# Patient Record
Sex: FEMALE | Race: WHITE | NOT HISPANIC OR LATINO
[De-identification: names, ages, dates, MRNs, and addresses within clinical notes are randomized per-mention and may not be internally consistent; named-entity substitution may affect disease eponyms.]

---

## 2022-11-02 PROBLEM — Z00.00 ENCOUNTER FOR PREVENTIVE HEALTH EXAMINATION: Status: ACTIVE | Noted: 2022-11-02

## 2022-11-09 ENCOUNTER — APPOINTMENT (OUTPATIENT)
Dept: ORTHOPEDIC SURGERY | Facility: CLINIC | Age: 32
End: 2022-11-09

## 2022-11-09 VITALS — WEIGHT: 130 LBS | HEIGHT: 64 IN | BODY MASS INDEX: 22.2 KG/M2

## 2022-11-09 DIAGNOSIS — M79.671 PAIN IN RIGHT FOOT: ICD-10-CM

## 2022-11-09 DIAGNOSIS — M76.71 PERONEAL TENDINITIS, RIGHT LEG: ICD-10-CM

## 2022-11-09 DIAGNOSIS — Z78.9 OTHER SPECIFIED HEALTH STATUS: ICD-10-CM

## 2022-11-09 PROCEDURE — 99204 OFFICE O/P NEW MOD 45 MIN: CPT

## 2022-11-09 PROCEDURE — 73610 X-RAY EXAM OF ANKLE: CPT | Mod: RT

## 2022-11-09 PROCEDURE — 73630 X-RAY EXAM OF FOOT: CPT | Mod: RT

## 2022-11-09 RX ORDER — MELOXICAM 15 MG/1
15 TABLET ORAL DAILY
Qty: 30 | Refills: 0 | Status: ACTIVE | COMMUNITY
Start: 2022-11-09 | End: 1900-01-01

## 2022-11-09 NOTE — IMAGING
[de-identified] : General: Well-nourished, well developed, in no apparent distress\par Psych: Clear speech, pleasant mood and affect\par Neurological: Alert and oriented to person, place, and time\par Gait: Normal\par Respiratory: Normal respiratory effort\par Skin: No rashes, no lesions, no open skin, no ecchymosis, no erythema\par \par \par Inspection: No swelling, ecchymosis or redness noted.\par \par Alignment: Hindfoot alignment in anatomic valgus, neutral midfoot alignment.\par  \par Palpation: No anterior medial, central or lateral ankle joint line tenderness. No posterior medial or lateral ankle pain. No pain over proximal, midshaft or distal tibia or fibula. Leg Compartments are soft and nontender. Calf is soft and non-tender with a down-going Emery test. No pain over the lateral and medial malleolus. No pain over ATFL and CFL. Non-tender over the deltoid ligament or proximal and distal syndesmosis. Negative squeeze test of the syndesmosis. Non-tender over the fibula head or neck. Non-tender over the sinus tarsi.\par No pain over the course of the achilles, peroneal, posterior tibial, anterior tibial or the extensor tendons. \par On examination of the foot: Foot compartments are soft and nontender. No pain over the heel or plantar fascia. No tenderness over the transverse tarsal joints, TMT or the Lisfranc joints on palpation and stress examination. No tenderness over the navicular, cuboid, cuneiforms, or metatarsals shafts. No pain beneath the metatarsal heads. Full range of motion through the MTP joints. The toes are reduced and well aligned. No pain on examination of the toes, and no webspace tenderness noted\par He is tender over the plantar lateral aspect of the midfoot beneath the fifth metatarsal as well as over the distal peroneal brevis tendon.\par \par ROM: 15-20 degrees of dorsiflexion, 40 degrees of plantar flexion, 20 degrees of inversion and 20 degrees of eversion without pain. Able to flex and extend all toes without pain \par \par Muscle Strength: 5/5 strength with resisted dorsiflexion, plantar flexion, inversion and eversion without any pain.\par \par Stability: No instability or laxity noted with varus/valgus stress. Negative anterior and posterior drawer test. No pain with dorsiflexion external rotation stress test.\par \par Neurologic Exam : Sensation is grossly intact bilateral upper and lower extremities to light touch throughout. Sensation intact to the sural, posterior tibial, superficial peroneal nerve. 2+ patellar tendon and Achilles tendon reflexes are noted. There is a downgoing Babinski test. No clonus is noted bilaterally.\par \par Vascular: Arterial Pulses right and Left: posterior tibialis normal and dorsalis pedis normal. Right and Left: no edema, no varicosities and brisk capillary refill test normal.\par \par Radiographs: X-rays done today in the office 3 views of the ankle and foot without any limitations which reveal: No acute fractures or dislocations seen. The ankle mortise and syndesmosis are reduced and well aligned. There is no widening of the syndesmosis. No evidence of an osteochondral defect. No fractures of the lateral process of the talus or of the anterior process of the calcaneus noted. The midfoot and forefoot joints are reduced and well aligned.

## 2022-11-09 NOTE — HISTORY OF PRESENT ILLNESS
[Sudden] : sudden [5] : 5 [3] : 3 [Dull/Aching] : dull/aching [Constant] : constant [de-identified] : Ms. ROSS is a 32 year female who presents today for evaluation of their right foot pain.  She states that over the past 2 months she has been developing pain on the outside aspect of her right foot.  She does not recall a distinct injury to the right ankle or right foot or a misstep.  She does stay physically active and enjoys spinning as well as powerwalking.  She has been walking and poorly supportive sneakers and shoes as well as a 2-year-old pair of sneakers.  The pain is a soreness she does not notice any redness warmth or bruising of the ankle or foot. [] : no [FreeTextEntry1] : right foot  [FreeTextEntry5] :  BRAD ROSS is a 32 year female who is here today for right foot pain, states she has been having pain for 2 months. denies any injury.

## 2023-02-28 ENCOUNTER — TRANSCRIPTION ENCOUNTER (OUTPATIENT)
Age: 33
End: 2023-02-28

## 2023-07-24 ENCOUNTER — APPOINTMENT (OUTPATIENT)
Dept: OTOLARYNGOLOGY | Facility: CLINIC | Age: 33
End: 2023-07-24

## 2024-09-24 ENCOUNTER — ASOB RESULT (OUTPATIENT)
Age: 34
End: 2024-09-24

## 2024-09-24 ENCOUNTER — APPOINTMENT (OUTPATIENT)
Dept: ANTEPARTUM | Facility: CLINIC | Age: 34
End: 2024-09-24
Payer: COMMERCIAL

## 2024-09-24 PROCEDURE — 99202 OFFICE O/P NEW SF 15 MIN: CPT | Mod: 25

## 2024-09-24 PROCEDURE — 76805 OB US >/= 14 WKS SNGL FETUS: CPT

## 2024-10-15 ENCOUNTER — ASOB RESULT (OUTPATIENT)
Age: 34
End: 2024-10-15

## 2024-10-15 ENCOUNTER — APPOINTMENT (OUTPATIENT)
Dept: ANTEPARTUM | Facility: CLINIC | Age: 34
End: 2024-10-15
Payer: COMMERCIAL

## 2024-10-15 PROCEDURE — 76811 OB US DETAILED SNGL FETUS: CPT

## 2025-01-05 ENCOUNTER — OUTPATIENT (OUTPATIENT)
Dept: OUTPATIENT SERVICES | Facility: HOSPITAL | Age: 35
LOS: 1 days | End: 2025-01-05
Payer: COMMERCIAL

## 2025-01-05 VITALS — DIASTOLIC BLOOD PRESSURE: 67 MMHG | SYSTOLIC BLOOD PRESSURE: 107 MMHG | HEART RATE: 84 BPM

## 2025-01-05 VITALS — SYSTOLIC BLOOD PRESSURE: 117 MMHG | HEART RATE: 99 BPM | DIASTOLIC BLOOD PRESSURE: 64 MMHG

## 2025-01-05 DIAGNOSIS — O26.899 OTHER SPECIFIED PREGNANCY RELATED CONDITIONS, UNSPECIFIED TRIMESTER: ICD-10-CM

## 2025-01-05 LAB
FLUAV AG NPH QL: SIGNIFICANT CHANGE UP
FLUBV AG NPH QL: SIGNIFICANT CHANGE UP
RAPID RVP RESULT: SIGNIFICANT CHANGE UP
RSV RNA NPH QL NAA+NON-PROBE: SIGNIFICANT CHANGE UP
SARS-COV-2 RNA SPEC QL NAA+PROBE: SIGNIFICANT CHANGE UP
SARS-COV-2 RNA SPEC QL NAA+PROBE: SIGNIFICANT CHANGE UP

## 2025-01-05 PROCEDURE — 87637 SARSCOV2&INF A&B&RSV AMP PRB: CPT

## 2025-01-05 PROCEDURE — G0463: CPT

## 2025-01-05 PROCEDURE — 0225U NFCT DS DNA&RNA 21 SARSCOV2: CPT

## 2025-01-05 PROCEDURE — 59025 FETAL NON-STRESS TEST: CPT

## 2025-01-05 RX ORDER — FAMOTIDINE 20 MG/1
20 TABLET, FILM COATED ORAL ONCE
Refills: 0 | Status: COMPLETED | OUTPATIENT
Start: 2025-01-05 | End: 2025-01-05

## 2025-01-05 RX ADMIN — FAMOTIDINE 20 MILLIGRAM(S): 20 TABLET, FILM COATED ORAL at 14:59

## 2025-01-05 NOTE — OB PROVIDER TRIAGE NOTE - NSOBPROVIDERNOTE_OBGYN_ALL_OB_FT
BRAD ROSS is a 34y  at 32w 1d GA who presents to L&D for decreased FM.     BPP   Dispo: Continue to observe.     Discussed with  BRAD ROSS is a 34y  at 32w 1d GA who presents to L&D for decreased FM.     BPP  with active NST. RVP negative. Pt to be discharged home. Will continue Tylenol for symptomatic relief. Return precautions discussed. Patient to follow up with Dr. Zendejas outpatient.     Discussed with Dr. Aashish Cutler, PGY-1

## 2025-01-05 NOTE — OB PROVIDER TRIAGE NOTE - NS ATTEND AMEND GEN_ALL_CORE FT
P0 who called the service reporting low grad temp and URI symptoms  also then that there was decreased fetal movements  spoke with the in patient team - reassuring cat 1 tracing - bpp 8/8   VSS and afebrile  RVP negative  will d/c home with precautions and follow up with changes or concerns particularly if fever occurs  Christin Zendejas MD

## 2025-01-05 NOTE — OB PROVIDER TRIAGE NOTE - NSHPPHYSICALEXAM_GEN_ALL_CORE
T(C): 37.4 (01-05-25 @ 14:27), Max: 37.4 (01-05-25 @ 14:27)  HR: 77 (01-05-25 @ 15:24) (77 - 104)  BP: 117/64 (01-05-25 @ 14:27) (117/64 - 117/64)  RR: 16 (01-05-25 @ 14:27) (16 - 16)  SpO2: 100% (01-05-25 @ 15:24) (98% - 100%)    Gen: NAD, well-appearing  Heart: RRR  Lungs: CTAB  Abd: soft, gravid  Ext: non-edematous, non-tender     SVE: Not performed  FHT: NST reactive- Baseline 140, mod variability, +accels, -decels  [place]: 1 contraction noted  bop T(C): 37.4 (01-05-25 @ 14:27), Max: 37.4 (01-05-25 @ 14:27)  HR: 77 (01-05-25 @ 15:24) (77 - 104)  BP: 117/64 (01-05-25 @ 14:27) (117/64 - 117/64)  RR: 16 (01-05-25 @ 14:27) (16 - 16)  SpO2: 100% (01-05-25 @ 15:24) (98% - 100%)    Gen: NAD, well-appearing  Heart: RRR  Lungs: CTAB  Abd: soft, gravid  Ext: non-edematous, non-tender     SVE: Not performed  FHT: NST reactive- Baseline 140, mod variability, +accels, -decels  [place]: 1 contraction noted  BPP: 8/8

## 2025-01-05 NOTE — OB RN TRIAGE NOTE - NSNURSINGINSTR_OBGYN_ALL_OB_FT
RVP panel resulst all negative.  Patient given  discharge instructions with verbalization of understanding.  Patient discharged to home with .

## 2025-01-05 NOTE — OB PROVIDER TRIAGE NOTE - HISTORY OF PRESENT ILLNESS
BRAD ROSS is a 34y  at 32w 1d GA who presents to L&D for decreased FM. Pt also endorsing low grade fevers, sore throat and congestion x4days. Has been taking Tylenol for symptoms. Pt denies vaginal bleeding, contractions and leakage of fluid. She endorses good fetal movement.       Pregnancy course is  uncomplicated. Pt follow with Dr. Zendejas.     POB: Denies  PGYN: -fibroids/-cysts, denied STD hx, denies abnormal PAPs  PMH: Denies  PSH: Ovarian cystectomy x3  SH: Denies tobacco use, EtOH use and illicit drug use during the pregnancy  Meds: PNV  All: NKDA

## 2025-01-05 NOTE — OB RN TRIAGE NOTE - FALL HARM RISK - UNIVERSAL INTERVENTIONS
Bed in lowest position, wheels locked, appropriate side rails in place/Call bell, personal items and telephone in reach/Instruct patient to call for assistance before getting out of bed or chair/Non-slip footwear when patient is out of bed/[place] to call system/Physically safe environment - no spills, clutter or unnecessary equipment/Purposeful Proactive Rounding/Room/bathroom lighting operational, light cord in reach

## 2025-01-09 DIAGNOSIS — R51.9 HEADACHE, UNSPECIFIED: ICD-10-CM

## 2025-01-09 DIAGNOSIS — O26.893 OTHER SPECIFIED PREGNANCY RELATED CONDITIONS, THIRD TRIMESTER: ICD-10-CM

## 2025-01-09 DIAGNOSIS — Z3A.32 32 WEEKS GESTATION OF PREGNANCY: ICD-10-CM

## 2025-01-09 DIAGNOSIS — R09.81 NASAL CONGESTION: ICD-10-CM

## 2025-01-09 DIAGNOSIS — O36.8130 DECREASED FETAL MOVEMENTS, THIRD TRIMESTER, NOT APPLICABLE OR UNSPECIFIED: ICD-10-CM

## 2025-01-09 DIAGNOSIS — J02.9 ACUTE PHARYNGITIS, UNSPECIFIED: ICD-10-CM

## 2025-01-09 DIAGNOSIS — O09.513 SUPERVISION OF ELDERLY PRIMIGRAVIDA, THIRD TRIMESTER: ICD-10-CM

## 2025-03-04 PROBLEM — Z78.9 OTHER SPECIFIED HEALTH STATUS: Chronic | Status: ACTIVE | Noted: 2025-01-05

## 2025-03-08 ENCOUNTER — INPATIENT (INPATIENT)
Facility: HOSPITAL | Age: 35
LOS: 1 days | Discharge: HOME | DRG: 951 | End: 2025-03-10
Attending: OBSTETRICS & GYNECOLOGY | Admitting: OBSTETRICS & GYNECOLOGY
Payer: COMMERCIAL

## 2025-03-08 VITALS
HEART RATE: 81 BPM | SYSTOLIC BLOOD PRESSURE: 127 MMHG | RESPIRATION RATE: 16 BRPM | DIASTOLIC BLOOD PRESSURE: 74 MMHG | TEMPERATURE: 97 F

## 2025-03-08 DIAGNOSIS — Z98.890 OTHER SPECIFIED POSTPROCEDURAL STATES: Chronic | ICD-10-CM

## 2025-03-08 DIAGNOSIS — O26.899 OTHER SPECIFIED PREGNANCY RELATED CONDITIONS, UNSPECIFIED TRIMESTER: ICD-10-CM

## 2025-03-08 DIAGNOSIS — Z34.80 ENCOUNTER FOR SUPERVISION OF OTHER NORMAL PREGNANCY, UNSPECIFIED TRIMESTER: ICD-10-CM

## 2025-03-08 LAB
BASOPHILS # BLD AUTO: 0 K/UL — SIGNIFICANT CHANGE UP (ref 0–0.2)
BASOPHILS NFR BLD AUTO: 0 % — SIGNIFICANT CHANGE UP (ref 0–2)
EOSINOPHIL # BLD AUTO: 0 K/UL — SIGNIFICANT CHANGE UP (ref 0–0.5)
EOSINOPHIL NFR BLD AUTO: 0 % — SIGNIFICANT CHANGE UP (ref 0–6)
HCT VFR BLD CALC: 36.9 % — SIGNIFICANT CHANGE UP (ref 34.5–45)
HGB BLD-MCNC: 12.7 G/DL — SIGNIFICANT CHANGE UP (ref 11.5–15.5)
LYMPHOCYTES # BLD AUTO: 0.62 K/UL — LOW (ref 1–3.3)
LYMPHOCYTES # BLD AUTO: 3.4 % — LOW (ref 13–44)
MANUAL SMEAR VERIFICATION: SIGNIFICANT CHANGE UP
MCHC RBC-ENTMCNC: 32.2 PG — SIGNIFICANT CHANGE UP (ref 27–34)
MCHC RBC-ENTMCNC: 34.4 G/DL — SIGNIFICANT CHANGE UP (ref 32–36)
MCV RBC AUTO: 93.7 FL — SIGNIFICANT CHANGE UP (ref 80–100)
METAMYELOCYTES # FLD: 0.9 % — HIGH (ref 0–0)
METAMYELOCYTES NFR BLD: 0.9 % — HIGH (ref 0–0)
MONOCYTES # BLD AUTO: 0.48 K/UL — SIGNIFICANT CHANGE UP (ref 0–0.9)
MONOCYTES NFR BLD AUTO: 2.6 % — SIGNIFICANT CHANGE UP (ref 2–14)
NEUTROPHILS # BLD AUTO: 17.07 K/UL — HIGH (ref 1.8–7.4)
NEUTROPHILS NFR BLD AUTO: 92.2 % — HIGH (ref 43–77)
NEUTS BAND # BLD: 0.9 % — SIGNIFICANT CHANGE UP (ref 0–8)
NEUTS BAND NFR BLD: 0.9 % — SIGNIFICANT CHANGE UP (ref 0–8)
PLAT MORPH BLD: NORMAL — SIGNIFICANT CHANGE UP
PLATELET # BLD AUTO: 244 K/UL — SIGNIFICANT CHANGE UP (ref 150–400)
RBC # BLD: 3.94 M/UL — SIGNIFICANT CHANGE UP (ref 3.8–5.2)
RBC # FLD: 13 % — SIGNIFICANT CHANGE UP (ref 10.3–14.5)
RBC BLD AUTO: SIGNIFICANT CHANGE UP
T PALLIDUM AB TITR SER: NEGATIVE — SIGNIFICANT CHANGE UP
WBC # BLD: 18.33 K/UL — HIGH (ref 3.8–10.5)
WBC # FLD AUTO: 18.33 K/UL — HIGH (ref 3.8–10.5)

## 2025-03-08 RX ORDER — PRAMOXINE HCL 1 %
1 GEL (GRAM) TOPICAL EVERY 4 HOURS
Refills: 0 | Status: DISCONTINUED | OUTPATIENT
Start: 2025-03-08 | End: 2025-03-10

## 2025-03-08 RX ORDER — DIBUCAINE 10 MG/G
1 OINTMENT TOPICAL EVERY 6 HOURS
Refills: 0 | Status: DISCONTINUED | OUTPATIENT
Start: 2025-03-08 | End: 2025-03-10

## 2025-03-08 RX ORDER — BENZOCAINE 220 MG/G
1 SPRAY, METERED PERIODONTAL EVERY 6 HOURS
Refills: 0 | Status: DISCONTINUED | OUTPATIENT
Start: 2025-03-08 | End: 2025-03-10

## 2025-03-08 RX ORDER — IBUPROFEN 200 MG
600 TABLET ORAL EVERY 6 HOURS
Refills: 0 | Status: COMPLETED | OUTPATIENT
Start: 2025-03-08 | End: 2026-02-04

## 2025-03-08 RX ORDER — OXYCODONE HYDROCHLORIDE 30 MG/1
5 TABLET ORAL ONCE
Refills: 0 | Status: DISCONTINUED | OUTPATIENT
Start: 2025-03-08 | End: 2025-03-10

## 2025-03-08 RX ORDER — MODIFIED LANOLIN 100 %
1 CREAM (GRAM) TOPICAL EVERY 6 HOURS
Refills: 0 | Status: DISCONTINUED | OUTPATIENT
Start: 2025-03-08 | End: 2025-03-10

## 2025-03-08 RX ORDER — OXYTOCIN-SODIUM CHLORIDE 0.9% IV SOLN 30 UNIT/500ML 30-0.9/5 UT/ML-%
167 SOLUTION INTRAVENOUS
Qty: 30 | Refills: 0 | Status: DISCONTINUED | OUTPATIENT
Start: 2025-03-08 | End: 2025-03-10

## 2025-03-08 RX ORDER — SIMETHICONE 80 MG
80 TABLET,CHEWABLE ORAL EVERY 4 HOURS
Refills: 0 | Status: DISCONTINUED | OUTPATIENT
Start: 2025-03-08 | End: 2025-03-10

## 2025-03-08 RX ORDER — WITCH HAZEL LEAF
1 FLUID EXTRACT MISCELLANEOUS EVERY 4 HOURS
Refills: 0 | Status: DISCONTINUED | OUTPATIENT
Start: 2025-03-08 | End: 2025-03-10

## 2025-03-08 RX ORDER — ACETAMINOPHEN 500 MG/5ML
975 LIQUID (ML) ORAL
Refills: 0 | Status: DISCONTINUED | OUTPATIENT
Start: 2025-03-08 | End: 2025-03-10

## 2025-03-08 RX ORDER — SODIUM CHLORIDE 9 G/1000ML
1000 INJECTION, SOLUTION INTRAVENOUS
Refills: 0 | Status: DISCONTINUED | OUTPATIENT
Start: 2025-03-08 | End: 2025-03-09

## 2025-03-08 RX ORDER — PRENATAL 136/IRON/FOLIC ACID 27 MG-1 MG
1 TABLET ORAL DAILY
Refills: 0 | Status: DISCONTINUED | OUTPATIENT
Start: 2025-03-08 | End: 2025-03-10

## 2025-03-08 RX ORDER — CLOSTRIDIUM TETANI TOXOID ANTIGEN (FORMALDEHYDE INACTIVATED), CORYNEBACTERIUM DIPHTHERIAE TOXOID ANTIGEN (FORMALDEHYDE INACTIVATED), BORDETELLA PERTUSSIS TOXOID ANTIGEN (GLUTARALDEHYDE INACTIVATED), BORDETELLA PERTUSSIS FILAMENTOUS HEMAGGLUTININ ANTIGEN (FORMALDEHYDE INACTIVATED), BORDETELLA PERTUSSIS PERTACTIN ANTIGEN, AND BORDETELLA PERTUSSIS FIMBRIAE 2/3 ANTIGEN 5; 2; 2.5; 5; 3; 5 [LF]/.5ML; [LF]/.5ML; UG/.5ML; UG/.5ML; UG/.5ML; UG/.5ML
0.5 INJECTION, SUSPENSION INTRAMUSCULAR ONCE
Refills: 0 | Status: DISCONTINUED | OUTPATIENT
Start: 2025-03-08 | End: 2025-03-10

## 2025-03-08 RX ORDER — OXYCODONE HYDROCHLORIDE 30 MG/1
5 TABLET ORAL
Refills: 0 | Status: DISCONTINUED | OUTPATIENT
Start: 2025-03-08 | End: 2025-03-10

## 2025-03-08 RX ORDER — ONDANSETRON HCL/PF 4 MG/2 ML
4 VIAL (ML) INJECTION ONCE
Refills: 0 | Status: COMPLETED | OUTPATIENT
Start: 2025-03-08 | End: 2025-03-08

## 2025-03-08 RX ORDER — CITRIC ACID/SODIUM CITRATE 300-500 MG
15 SOLUTION, ORAL ORAL EVERY 6 HOURS
Refills: 0 | Status: DISCONTINUED | OUTPATIENT
Start: 2025-03-08 | End: 2025-03-09

## 2025-03-08 RX ORDER — DIPHENHYDRAMINE HCL 12.5MG/5ML
25 ELIXIR ORAL EVERY 6 HOURS
Refills: 0 | Status: DISCONTINUED | OUTPATIENT
Start: 2025-03-08 | End: 2025-03-10

## 2025-03-08 RX ORDER — CALCIUM CARBONATE 750 MG/1
1 TABLET ORAL EVERY 6 HOURS
Refills: 0 | Status: DISCONTINUED | OUTPATIENT
Start: 2025-03-08 | End: 2025-03-10

## 2025-03-08 RX ORDER — HYDROCORTISONE 10 MG/G
1 CREAM TOPICAL EVERY 6 HOURS
Refills: 0 | Status: DISCONTINUED | OUTPATIENT
Start: 2025-03-08 | End: 2025-03-10

## 2025-03-08 RX ORDER — KETOROLAC TROMETHAMINE 30 MG/ML
30 INJECTION, SOLUTION INTRAMUSCULAR; INTRAVENOUS ONCE
Refills: 0 | Status: DISCONTINUED | OUTPATIENT
Start: 2025-03-08 | End: 2025-03-10

## 2025-03-08 RX ORDER — OXYTOCIN-SODIUM CHLORIDE 0.9% IV SOLN 30 UNIT/500ML 30-0.9/5 UT/ML-%
167 SOLUTION INTRAVENOUS
Qty: 30 | Refills: 0 | Status: COMPLETED | OUTPATIENT
Start: 2025-03-08 | End: 2025-03-08

## 2025-03-08 RX ORDER — MAGNESIUM HYDROXIDE 400 MG/5ML
30 SUSPENSION ORAL
Refills: 0 | Status: DISCONTINUED | OUTPATIENT
Start: 2025-03-08 | End: 2025-03-10

## 2025-03-08 RX ADMIN — Medication 15 MILLILITER(S): at 14:21

## 2025-03-08 RX ADMIN — Medication 4 MILLIGRAM(S): at 17:40

## 2025-03-08 RX ADMIN — OXYTOCIN-SODIUM CHLORIDE 0.9% IV SOLN 30 UNIT/500ML 167 MILLIUNIT(S)/MIN: 30-0.9/5 SOLUTION at 23:32

## 2025-03-08 RX ADMIN — Medication 20 MILLIGRAM(S): at 19:36

## 2025-03-08 RX ADMIN — CALCIUM CARBONATE 1 TABLET(S): 750 TABLET ORAL at 15:37

## 2025-03-08 NOTE — PRE-ANESTHESIA EVALUATION ADULT - NSANTHPMHFT_GEN_ALL_CORE
35F , NKDA, no significant PMH/PSH. Reports L2 transverse process fracture, no surgical intervention done. ROS negative for HA, lightheadedness, dizziness, CP, dyspnea.

## 2025-03-08 NOTE — OB RN PATIENT PROFILE - SUICIDE SCREENING QUESTION 1
Last Visit: 8/9  Next Appt: 11/9  Previous Refill Encounter: 1/4-90+1    Requested Prescriptions     Pending Prescriptions Disp Refills    losartan (COZAAR) 50 mg tablet 90 Tab 3     Sig: TAKE 1 TABLET BY MOUTH EVERY DAY FOR BLOOD PRESSURE
No

## 2025-03-08 NOTE — OB PROVIDER LABOR PROGRESS NOTE - NS_OBIHIFHRDETAILS_OBGYN_ALL_OB_FT
moderate variability, accels present, no decels
EFM: 145/mod. variability/+accels/-decels  [place]: q 2-6min
 moderate variability, accels present
baseline 120, mod agustín, + accels, - decels

## 2025-03-08 NOTE — OB PROVIDER H&P - NSLOWPPHRISK_OBGYN_A_OB
No previous uterine incision/Tom Pregnancy/Less than or equal to 4 previous vaginal births/No known bleeding disorder/No history of postpartum hemorrhage

## 2025-03-08 NOTE — OB NEONATOLOGY/PEDIATRICIAN DELIVERY SUMMARY - NSPEDSNEONOTESA_OBGYN_ALL_OB_FT
41wk male born via  to a 36 y/o  blood type A+ mother. Maternal history of ovarian cysts. No significant prenatal history. PNL -/-/NR/I, GBS - on 2/3. AROM at  with clear fluids. Baby emerged vigorous, crying, was w/d/s/s with APGARS of 8/9. Heavy meconium at delivery. Mom plans to initiate breastfeeding, declines Hep B vaccine, declines erythromycin and declines circ.  EOS 0.24.  Highest maternal temp 37.5

## 2025-03-08 NOTE — OB PROVIDER H&P - NSMODPPHRISK_OBGYN_A_OB
AMA - over 35 years of age Over-distended uterus: Multiple gestation, polyhydramnios, Macrosomia/AMA - over 35 years of age

## 2025-03-08 NOTE — OB PROVIDER H&P - HISTORY OF PRESENT ILLNESS
35y  at 41.0 weeks GA presents to L&D for rule out labor. Patient started having contractions last night 10:30pm every 3-4 minutes. Patient denies vaginal bleeding and leakage of fluid. She endorses good fetal movement. Denies fevers, chills, nausea and vomiting. No other complaints at this time. Prenatal course uncomplicated.    CORA: 2025    POB: denies  PGYN: hx ovarian cysts bilaterally s/p cystectomy x3; denies fibroids,  STD hx, or abnormal PAPs   PMH: Denies  PSH: ovarian cystectomy x3 (last in )  SH: Denies EtOH, tobacco and illicit drug use during this pregnancy; feels safe at home   Psych Hx: denies hx of anxiety or depression  Meds: PNVs, bASA (stopped at 36w)  Allergies: NKDA    EFW: 4200g (extrapolated from sono last week 8#14)    GBS: negative

## 2025-03-08 NOTE — OB PROVIDER LABOR PROGRESS NOTE - ASSESSMENT
A/P: Labor Latent at 41 weeks  Expectant management  Epi PRN  Pt made cervical change from 2-3/50 to 4/60/-2    A/P: Labor  -pitocin if needed  -epi prn  Follow protocol for fetal monitoring per L&D as pt wants to ambulate  and fetal tracing is reactive and she is in latent labor.  Ayesha Jolly MD
A/P:   Labor at 41 weeks  s/p SROM  begin pushing with michaela Jolly MD
Plan:  35y P0 in labor    - for top off   - for AROM with next exam    D/w Dr. Shanae Salazar PGY-2
Plan: 34yo  @ 41w, making change with exp mgmt  - Con't exp mgmt  - cont IVF  - will have intermittent fetal monitoring    d/w attending physician Dr. Shanae Hudson MD  PGY-3
[General Appearance - Alert] : alert
[Sclera] : the sclera and conjunctiva were normal
[General Appearance - Well Developed] : well developed
[Outer Ear] : the ears and nose were normal in appearance
[Oropharynx] : the oropharynx was normal
[Neck Appearance] : the appearance of the neck was normal
[Thyroid Nodule] : there were no palpable thyroid nodules
[Thyroid Diffuse Enlargement] : the thyroid was not enlarged
[Jugular Venous Distention Increased] : there was no jugular-venous distention
[Neck Cervical Mass (___cm)] : no neck mass was observed
[Auscultation Breath Sounds / Voice Sounds] : lungs were clear to auscultation bilaterally
[Heart Sounds] : normal S1 and S2
[Heart Rate And Rhythm] : heart rate was normal and rhythm regular
[Heart Sounds Gallop] : no gallops
[Heart Sounds Pericardial Friction Rub] : no pericardial rub
[Murmurs] : no murmurs
[Bowel Sounds] : normal bowel sounds
[Edema] : there was no peripheral edema
[Full Pulse] : the pedal pulses are present
[Abdomen Soft] : soft
[Abdomen Tenderness] : non-tender
[Abdomen Mass (___ Cm)] : no abdominal mass palpated
[Cervical Lymph Nodes Enlarged Anterior Bilaterally] : anterior cervical
[Cervical Lymph Nodes Enlarged Posterior Bilaterally] : posterior cervical
[Supraclavicular Lymph Nodes Enlarged Bilaterally] : supraclavicular
[No CVA Tenderness] : no ~M costovertebral angle tenderness
[No Spinal Tenderness] : no spinal tenderness
[Skin Color & Pigmentation] : normal skin color and pigmentation
[Skin Turgor] : normal skin turgor
[] : no rash
[Motor Exam] : the motor exam was normal
[No Focal Deficits] : no focal deficits
[Sensation] : the sensory exam was normal to light touch and pinprick
[Affect] : the affect was normal
[Impaired Insight] : insight and judgment were intact
[Oriented To Time, Place, And Person] : oriented to person, place, and time
[FreeTextEntry1] : L shoulder impingement w/ POM and abduction lacking 30-40 degrees; R > L 2nd flexor tendon nodule ttt- but no triggering/ locking ; OA changes of hands but no overt synovitis; There are multiple paired tender points in the following areas: Base of the occiput on the left and right side of the neck, 2 on each trapezius muscle, bilateral supraclavicular areas, anterior, superior costochondral junctions, bilateral epicondyles, low back at the episacral fat pads, bilateral greater trochanteric bursae, and the medial, superior suprapatellar bursa of both knees.\par

## 2025-03-08 NOTE — OB PROVIDER LABOR PROGRESS NOTE - NS_SUBJECTIVE/OBJECTIVE_OBGYN_ALL_OB_FT
Patient doesn't want an epidural
Labor & Delivery Progress Note     Pt seen & examined at bedside for update to labor curve.     T(C): 36.9 (03-08-25 @ 16:27), Max: 37.1 (03-08-25 @ 11:37)  HR: 89 (03-08-25 @ 17:17) (73 - 133)  BP: 117/67 (03-08-25 @ 17:09) (91/53 - 159/85)  RR: 16 (03-08-25 @ 16:27) (16 - 18)  SpO2: 100% (03-08-25 @ 17:17) (92% - 100%)
R3 Labor & Delivery Progress Note     Pt seen & examined at bedside for vaginal exam    T(C): 37.1 (03-08-25 @ 13:19), Max: 37.1 (03-08-25 @ 11:37)  HR: 100 (03-08-25 @ 13:49) (73 - 117)  BP: 100/56 (03-08-25 @ 13:49) (100/56 - 142/74)  RR: 18 (03-08-25 @ 13:19) (16 - 18)  SpO2: 100% (03-08-25 @ 13:47) (97% - 100%)
Pt feels some back pain

## 2025-03-08 NOTE — OB PROVIDER H&P - NSLOWPPHRISK_OBGYN_A_OB_CAL
disinfect your home every day. Use household  and disinfectant wipes or sprays. Take special care to clean things that you grab with your hands. These include doorknobs, remote controls, phones, and handles on your refrigerator and microwave. And don't forget countertops, tabletops, bathrooms, and computer keyboards.  When to call for help  Call 911 anytime you think you may need emergency care. For example, call if:  You have severe trouble breathing. (You can't talk at all.)  You have constant chest pain or pressure.  You are severely dizzy or lightheaded.  You are confused or can't think clearly.  Your face and lips have a blue color.  You pass out (lose consciousness) or are very hard to wake up.  Call your doctor now if you develop symptoms such as:  Shortness of breath.  Fever.  Cough.  If you need to get care, call ahead to the doctor's office for instructions before you go. Make sure you wear a face mask, if you have one, to prevent exposing other people to the virus.  Where can you get the latest information?  The following health organizations are tracking and studying this virus. Their websites contain the most up-to-date information. You'll also learn what to do if you think you may have been exposed to the virus.  U.S. Centers for Disease Control and Prevention (CDC): The CDC provides updated news about the disease and travel advice. The website also tells you how to prevent the spread of infection. www.cdc.gov  World Health Organization (WHO): WHO offers information about the virus outbreaks. WHO also has travel advice. www.who.int  Current as of: April 1, 2020               Content Version: 12.4  © 2006-2020 [employer].   Care instructions adapted under license by your healthcare professional. If you have questions about a medical condition or this instruction, always ask your healthcare professional. [employer] disclaims any warranty or liability for your use of  none 5

## 2025-03-08 NOTE — PRE-ANESTHESIA EVALUATION ADULT - NSANTHAIRWAYFT_ENT_ALL_CORE
Mallampati Class 3, interincisor gap >3 cm, thyromental distance > 3 fingerbreadths, neck full range of motion.

## 2025-03-08 NOTE — OB PROVIDER H&P - NSHPPHYSICALEXAM_GEN_ALL_CORE
T(C): 36.1 (03-08-25 @ 08:34), Max: 36.1 (03-08-25 @ 08:32)  HR: 76 (03-08-25 @ 08:53) (76 - 86)  BP: 127/74 (03-08-25 @ 08:34) (127/74 - 127/74)  RR: 16 (03-08-25 @ 08:34) (16 - 16)  SpO2: 100% (03-08-25 @ 08:53) (98% - 100%)  Gen: NAD, well-appearing   Abd: Soft, gravid  SVE: 2.5/80/-2  Bedside sono: vertex  FHT: baseline 125 bpm, moderate variability, +Accels, -decels  [place]: q 2-4 min

## 2025-03-08 NOTE — OB PROVIDER H&P - ASSESSMENT
A/P: 35y  at 41.0 weeks GA presents to L&D for early labor.  -Admit to L&D  -Consent signed  -Admission labs  -CLD  -IV fluids  -Labor: Intact. Latent labor. [place] q 2-4 min. Expectant management.  -Fetus: Cat I tracing. Continuous toco and fetal monitoring.   -GBS: Negative, no GBS ppx required   -Analgesia: epi PRN  -DVT ppx: Ambulate and SCD's while in bed     Discussed with Dr. Jolly, attending    Gloria Luevano, PGY1

## 2025-03-08 NOTE — OB RN PATIENT PROFILE - FALL HARM RISK - UNIVERSAL INTERVENTIONS
Bed in lowest position, wheels locked, appropriate side rails in place/Call bell, personal items and telephone in reach/Instruct patient to call for assistance before getting out of bed or chair/Non-slip footwear when patient is out of bed/[place] to call system/Physically safe environment - no spills, clutter or unnecessary equipment/Purposeful Proactive Rounding/Room/bathroom lighting operational, light cord in reach

## 2025-03-08 NOTE — OB RN PATIENT PROFILE - NSICDXPASTMEDICALHX_GEN_ALL_CORE_FT
-PCP Contacted on Admission: (Y/N) --> Name & Phone #: Dr. Huitron Sai [phone]  -Date of Contact with PCP: Dr. Pedraza (outpatient pulm) contacted on admission.  -PCP Contacted at Discharge: (Y/N, N/A)  -Summary of Handoff Given to PCP:   -Post-Discharge Appointment Date and Location: -PCP Contacted on Admission: (Y/N) --> Name & Phone #: Dr. Huitron Sai [phone]  -Date of Contact with PCP: Dr. Pedraza (outpatient pulm) contacted on admission.  -PCP Contacted at Discharge: (Y/N, N/A)  -Summary of Handoff Given to PCP:   -Post-Discharge Appointment Date and Location: -PCP Contacted on Admission: (Y/N) --> Name & Phone #: Dr. Huitron Sai [phone]  -Date of Contact with PCP: Dr. Pedraza (outpatient pulm) contacted on admission.  -PCP Contacted at Discharge: (Y/N, N/A)  -Summary of Handoff Given to PCP:   -Post-Discharge Appointment Date and Location: -PCP Contacted on Admission: (Y/N) --> Name & Phone #: Dr. Huitron Sai [phone]  -Date of Contact with PCP: Dr. Pedraza (outpatient pulm) contacted on admission.  -PCP Contacted at Discharge: (Y/N, N/A)  -Summary of Handoff Given to PCP:   -Post-Discharge Appointment Date and Location: -PCP Contacted on Admission: (Y/N) --> Name & Phone #: Dr. Huitron Sai [phone]  -Date of Contact with PCP: Dr. Pedraza (outpatient pulm) contacted on admission.  -PCP Contacted at Discharge: (Y/N, N/A)  -Summary of Handoff Given to PCP:   -Post-Discharge Appointment Date and Location: -PCP Contacted on Admission: (Y/N) --> Name & Phone #: Dr. Huitron Sai [phone]  -Date of Contact with PCP: Dr. Pedraza (outpatient pulm) contacted on admission.  -PCP Contacted at Discharge: (Y/N, N/A)  -Summary of Handoff Given to PCP:   -Post-Discharge Appointment Date and Location: -PCP Contacted on Admission: (Y/N) --> Name & Phone #: Dr. Huitron Sai [phone]  -Date of Contact with PCP: Dr. Pedraza (outpatient pulm) contacted on admission.  -PCP Contacted at Discharge: (Y/N, N/A)  -Summary of Handoff Given to PCP:   -Post-Discharge Appointment Date and Location: PAST MEDICAL HISTORY:  No pertinent past medical history

## 2025-03-08 NOTE — OB PROVIDER H&P - NS_PELVIS_OBGYN_ALL_OB
yonis RODRIGUEZ MD    I spent a total of 45 minutes on the date of the service which included preparing to see the patient, face-to-face patient care, completing clinical documentation, obtaining and/or reviewing separately obtained history, performing a medically appropriate examination, counseling and educating the patient/family/caregiver, ordering medications, tests, or procedures, communicating with other HCPs (not separately reported), independently interpreting results (not separately reported), communicating results to the patient/family/caregiver and care coordination (not separately reported).      This note is created with the assistance of a speech recognition program.  While intending to generate a document that actually reflects the content of the visit, the document can still have some errors including those of syntax and sound a like substitutions which may escape proof reading.  It such instances, actual meaning can be extrapolated by contextual diversion.   Adequate

## 2025-03-09 RX ORDER — IBUPROFEN 200 MG
600 TABLET ORAL EVERY 6 HOURS
Refills: 0 | Status: DISCONTINUED | OUTPATIENT
Start: 2025-03-09 | End: 2025-03-10

## 2025-03-09 RX ADMIN — Medication 600 MILLIGRAM(S): at 17:23

## 2025-03-09 RX ADMIN — Medication 600 MILLIGRAM(S): at 13:40

## 2025-03-09 RX ADMIN — Medication 975 MILLIGRAM(S): at 22:38

## 2025-03-09 RX ADMIN — Medication 975 MILLIGRAM(S): at 04:45

## 2025-03-09 RX ADMIN — Medication 975 MILLIGRAM(S): at 09:16

## 2025-03-09 RX ADMIN — Medication 975 MILLIGRAM(S): at 10:10

## 2025-03-09 RX ADMIN — Medication 600 MILLIGRAM(S): at 06:44

## 2025-03-09 RX ADMIN — Medication 1 TABLET(S): at 12:40

## 2025-03-09 RX ADMIN — Medication 600 MILLIGRAM(S): at 12:40

## 2025-03-09 RX ADMIN — Medication 600 MILLIGRAM(S): at 18:23

## 2025-03-09 RX ADMIN — Medication 975 MILLIGRAM(S): at 05:45

## 2025-03-09 RX ADMIN — Medication 3 MILLILITER(S): at 05:00

## 2025-03-09 RX ADMIN — Medication 975 MILLIGRAM(S): at 21:38

## 2025-03-09 NOTE — DISCHARGE NOTE OB - CARE PROVIDER_API CALL
Ayesha Jolly  Obstetrics and Gynecology  [address], [address]  [place] [address]  Phone: [phone]  Fax: [phone]  Follow Up Time:

## 2025-03-09 NOTE — OB RN DELIVERY SUMMARY - NSSELHIDDEN_OBGYN_ALL_OB_FT
[NS_DeliveryAttending1_OBGYN_ALL_OB_FT:[id number]],[NS_DeliveryRN_OBGYN_ALL_OB_FT:[id number]=]

## 2025-03-09 NOTE — OB PROVIDER DELIVERY SUMMARY - NSSELHIDDEN_OBGYN_ALL_OB_FT
[NS_DeliveryAttending1_OBGYN_ALL_OB_FT:[id number]=],[NS_DeliveryRN_OBGYN_ALL_OB_FT:[id number]=]

## 2025-03-09 NOTE — DISCHARGE NOTE OB - IF BREASTFEEDING, HAVE A GLASS OF WATER, JUICE, OR LOW-FAT MILK EACH TIME YOU FEED YOUR BABY
Tylenol/Motrin for pain Ice to affected area at least 3x daily for 20 minutes each Rest Ice Compression Elevation Wear splint until follow up
Statement Selected

## 2025-03-09 NOTE — OB RN DELIVERY SUMMARY - NS_SEPSISRSKCALC_OBGYN_ALL_OB_FT
EOS calculated successfully. EOS Risk Factor: 0.5/1000 live births ([facility] national incidence); GA=41w;Temp=99.5; ROM=2.833; GBS='Negative'; Antibiotics='No antibiotics or any antibiotics < 2 hrs prior to birth'

## 2025-03-09 NOTE — DISCHARGE NOTE OB - NS MD DC FALL RISK RISK
For information on Fall & Injury Prevention, visit: [email or web address] OR  [email or web address] OR  https://www.cdc.gov/steadi/patient.html

## 2025-03-09 NOTE — DISCHARGE NOTE OB - PATIENT PORTAL LINK FT
You can access the FollowMyHealth Patient Portal offered by [facility] by registering at the following website: [email or web address]. By joining [facility]’s FollowMyHealth portal, you will also be able to view your health information using other applications (apps) compatible with our system.

## 2025-03-09 NOTE — DISCHARGE NOTE OB - HOSPITAL COURSE
presents in labor at 41 weeks. She had a vaginal delivery of male infant. She is stable for discharge on PPD

## 2025-03-09 NOTE — OB PROVIDER DELIVERY SUMMARY - NSPROVIDERDELIVERYNOTE_OBGYN_ALL_OB_FT
Patient progressed to fully dilated and pushed a viable male infant over an intact perineum. Body delivered easily and there was a loose nuchal cord reduced. There was thick meconium seen after the head delivered. Delayed cord clamping performed as baby had spontaneous cry. Peds present after delivery and baby placed in warmer with peds for suctioning. Cord gas collected. Placenta delivered spontaneously intact. Fundus firm. . Inspection revealed a left labial lac and a first degree right vaginal side wall lac. Repaired with 2.0 and 3.0 vicryl rapide. Local lidocaine injected in the left labia for the repair. Pt and  stable in LDR. Counts correct.  Ayesha Jolly MD

## 2025-03-09 NOTE — PROGRESS NOTE ADULT - ASSESSMENT
34yo PPD#1 s/p .  Patient is stable and doing well post-partum.     - Pain well controlled, continue current pain regimen  - Increase ambulation, SCDs when not ambulating  - Continue regular diet  - TOV today    Christin Cutler, PGY-1
Age appropriate

## 2025-03-09 NOTE — OB RN DELIVERY SUMMARY - BABY A SEX
- Continue Losartan 50 mg daily, hydralazine 50 mg TID  - BP stable with home regimen  - Will continue to monitor   Male

## 2025-03-09 NOTE — PROGRESS NOTE ADULT - ATTENDING COMMENTS
Pt seen on am rounds and note reviewed  SHe has voided  Trying to breastfeeding  VSS AF  fundus firm U-2  ext: no cords  A/P: PPD 1 s/p   Routine care  Anticipate d/c in the am  Ayesah Jolly MD

## 2025-03-09 NOTE — DISCHARGE NOTE OB - FINANCIAL ASSISTANCE
[facility] provides services at a reduced cost to those who are determined to be eligible through [facility]’s financial assistance program. Information regarding [facility]’s financial assistance program can be found by going to [email or web address] or by calling [phone].

## 2025-03-09 NOTE — DISCHARGE NOTE OB - CARE PLAN
Principal Discharge DX:	Normal vaginal delivery  Assessment and plan of treatment:	 at 41 weeks had a vaginal delivery of male infant. She is stable for discharge on PPD   1

## 2025-03-10 VITALS
HEART RATE: 72 BPM | DIASTOLIC BLOOD PRESSURE: 60 MMHG | OXYGEN SATURATION: 99 % | RESPIRATION RATE: 18 BRPM | SYSTOLIC BLOOD PRESSURE: 103 MMHG | TEMPERATURE: 98 F

## 2025-03-10 PROCEDURE — 86780 TREPONEMA PALLIDUM: CPT

## 2025-03-10 PROCEDURE — 86900 BLOOD TYPING SEROLOGIC ABO: CPT

## 2025-03-10 PROCEDURE — 36415 COLL VENOUS BLD VENIPUNCTURE: CPT

## 2025-03-10 PROCEDURE — 86901 BLOOD TYPING SEROLOGIC RH(D): CPT

## 2025-03-10 PROCEDURE — 59050 FETAL MONITOR W/REPORT: CPT

## 2025-03-10 PROCEDURE — 85025 COMPLETE CBC W/AUTO DIFF WBC: CPT

## 2025-03-10 PROCEDURE — 86850 RBC ANTIBODY SCREEN: CPT

## 2025-03-10 RX ORDER — ACETAMINOPHEN 500 MG/5ML
3 LIQUID (ML) ORAL
Qty: 0 | Refills: 0 | DISCHARGE
Start: 2025-03-10

## 2025-03-10 RX ORDER — DIBUCAINE 10 MG/G
1 OINTMENT TOPICAL
Qty: 0 | Refills: 0 | DISCHARGE
Start: 2025-03-10

## 2025-03-10 RX ORDER — IBUPROFEN 200 MG
1 TABLET ORAL
Qty: 0 | Refills: 0 | DISCHARGE
Start: 2025-03-10

## 2025-03-10 RX ORDER — PRENATAL 136/IRON/FOLIC ACID 27 MG-1 MG
1 TABLET ORAL
Qty: 0 | Refills: 0 | DISCHARGE
Start: 2025-03-10

## 2025-03-10 RX ADMIN — Medication 600 MILLIGRAM(S): at 06:43

## 2025-03-10 RX ADMIN — Medication 600 MILLIGRAM(S): at 00:31

## 2025-03-10 RX ADMIN — Medication 600 MILLIGRAM(S): at 12:05

## 2025-03-10 RX ADMIN — Medication 1 TABLET(S): at 11:35

## 2025-03-10 RX ADMIN — Medication 600 MILLIGRAM(S): at 01:31

## 2025-03-10 RX ADMIN — Medication 975 MILLIGRAM(S): at 03:05

## 2025-03-10 RX ADMIN — Medication 600 MILLIGRAM(S): at 05:43

## 2025-03-10 RX ADMIN — Medication 600 MILLIGRAM(S): at 11:35

## 2025-03-10 RX ADMIN — Medication 975 MILLIGRAM(S): at 04:05

## 2025-03-10 RX ADMIN — Medication 975 MILLIGRAM(S): at 08:40

## 2025-03-10 RX ADMIN — Medication 975 MILLIGRAM(S): at 09:10

## 2025-03-10 NOTE — PROGRESS NOTE ADULT - SUBJECTIVE AND OBJECTIVE BOX
PPD#2- ATTENDING NOTE    S: Patient doing well. Minimal lochia. Pain controlled.    O: Vital Signs Last 24 Hrs  T(C): 36.5 (10 Mar 2025 05:05), Max: 37 (09 Mar 2025 13:02)  T(F): 97.7 (10 Mar 2025 05:05), Max: 98.6 (09 Mar 2025 13:02)  HR: 72 (10 Mar 2025 05:05) (72 - 80)  BP: 103/60 (10 Mar 2025 05:05) (90/60 - 109/70)  BP(mean): --  RR: 18 (10 Mar 2025 05:05) (18 - 18)  SpO2: 99% (10 Mar 2025 05:05) (97% - 99%)    Parameters below as of 10 Mar 2025 05:05  Patient On (Oxygen Delivery Method): room air        Gen: NAD  Abd: soft, NT, ND, fundus firm below umbilicus  Lochia: moderate  Ext: no tenderness, no hyper reflexia  Voiding well    Labs:    ABO Interpretation: A (25 @ 10:32)  Rh Interpretation: Positive (25 @ 10:32)  ABO Interpretation: A (25 @ 10:32)  Rh Interpretation: Positive (25 @ 10:32)   Antibody ScreenNegative                        12.7   18.33 )-----------( 244      ( 08 Mar 2025 09:57 )             36.9       A: 35y PPD# s/p  doing well.    Plan:  Analgesia prn  Regular diet  Discharge instruction given  F/U 6 weeks      Office [phone]  Dr. Coello    
36yo PPD#1 s/p . Patient feels well. Pain is well controlled, tolerating regular diet, passing flatus. Denies heavy vaginal bleeding, CP/SOB, N/V, lightheadedness/dizziness. Has not yet voided since catheter removal. Ambulated minimally since delivery.    O:  Vitals:  Vital Signs Last 24 Hrs  T(C): 36.7 (09 Mar 2025 00:37), Max: 37.5 (08 Mar 2025 18:57)  T(F): 98.1 (09 Mar 2025 00:37), Max: 99.5 (08 Mar 2025 18:57)  HR: 98 (09 Mar 2025 03:32) (73 - 152)  BP: 121/74 (09 Mar 2025 03:22) (91/53 - 159/85)  BP(mean): --  RR: 18 (09 Mar 2025 03:04) (16 - 18)  SpO2: 93% (09 Mar 2025 03:32) (79% - 100%)    Parameters below as of 09 Mar 2025 03:04  Patient On (Oxygen Delivery Method): room air        MEDICATIONS  (STANDING):  acetaminophen     Tablet .. 975 milliGRAM(s) Oral <User Schedule>  chlorhexidine 2% Cloths 1 Application(s) Topical daily  citric acid/sodium citrate Solution 15 milliLiter(s) Oral every 6 hours  dextrose 5% + lactated ringers. 1000 milliLiter(s) (125 mL/Hr) IV Continuous <Continuous>  diphtheria/tetanus/pertussis (acellular) Vaccine (Adacel) 0.5 milliLiter(s) IntraMuscular once  ibuprofen  Tablet. 600 milliGRAM(s) Oral every 6 hours  ketorolac   Injectable 30 milliGRAM(s) IV Push once  lactated ringers. 1000 milliLiter(s) (125 mL/Hr) IV Continuous <Continuous>  oxytocin Infusion 167 milliUNIT(s)/Min (167 mL/Hr) IV Continuous <Continuous>  prenatal multivitamin 1 Tablet(s) Oral daily  sodium chloride 0.9% lock flush 3 milliLiter(s) IV Push every 8 hours      MEDICATIONS  (PRN):  benzocaine 20%/menthol 0.5% Spray 1 Spray(s) Topical every 6 hours PRN for Perineal discomfort  calcium carbonate    500 mG (Tums) Chewable 1 Tablet(s) Chew every 6 hours PRN Heartburn  dibucaine 1% Ointment 1 Application(s) Topical every 6 hours PRN Perineal discomfort  diphenhydrAMINE 25 milliGRAM(s) Oral every 6 hours PRN Pruritus  hydrocortisone 1% Cream 1 Application(s) Topical every 6 hours PRN Moderate Pain (4-6)  lanolin Ointment 1 Application(s) Topical every 6 hours PRN nipple soreness  magnesium hydroxide Suspension 30 milliLiter(s) Oral two times a day PRN Constipation  oxyCODONE    IR 5 milliGRAM(s) Oral every 3 hours PRN Moderate to Severe Pain (4-10)  oxyCODONE    IR 5 milliGRAM(s) Oral once PRN Moderate to Severe Pain (4-10)  pramoxine 1%/zinc 5% Cream 1 Application(s) Topical every 4 hours PRN Moderate Pain (4-6)  simethicone 80 milliGRAM(s) Chew every 4 hours PRN Gas  witch hazel Pads 1 Application(s) Topical every 4 hours PRN Perineal discomfort      Labs:  Blood type: A Positive  Rubella IgG: RPR: Negative                          12.7   18.33[H] >-----------< 244    (  @ 09:57 )             36.9                  Physical Exam:  General: NAD  Abdomen: soft, non-tender, non-distended, fundus firm  Vaginal: No heavy vaginal bleeding  Extremities: No erythema/edema

## 2025-03-17 ENCOUNTER — APPOINTMENT (OUTPATIENT)
Age: 35
End: 2025-03-17
Payer: COMMERCIAL

## 2025-03-17 PROCEDURE — S9443: CPT | Mod: 95

## 2025-04-21 ENCOUNTER — RESULT REVIEW (OUTPATIENT)
Age: 35
End: 2025-04-21